# Patient Record
Sex: MALE | Race: WHITE
[De-identification: names, ages, dates, MRNs, and addresses within clinical notes are randomized per-mention and may not be internally consistent; named-entity substitution may affect disease eponyms.]

---

## 2019-09-20 ENCOUNTER — HOSPITAL ENCOUNTER (EMERGENCY)
Dept: HOSPITAL 25 - UCCORT | Age: 29
Discharge: HOME | End: 2019-09-20
Payer: COMMERCIAL

## 2019-09-20 VITALS — DIASTOLIC BLOOD PRESSURE: 96 MMHG | SYSTOLIC BLOOD PRESSURE: 146 MMHG

## 2019-09-20 DIAGNOSIS — F17.210: ICD-10-CM

## 2019-09-20 DIAGNOSIS — E11.9: ICD-10-CM

## 2019-09-20 DIAGNOSIS — S06.0X9A: ICD-10-CM
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
## 2019-09-20 DIAGNOSIS — Z79.4: ICD-10-CM

## 2019-09-20 DIAGNOSIS — Z79.84: ICD-10-CM

## 2019-09-20 DIAGNOSIS — E78.5: ICD-10-CM
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
## 2019-09-20 DIAGNOSIS — I10: ICD-10-CM

## 2019-09-20 DIAGNOSIS — S09.90XA: Primary | ICD-10-CM
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
## 2019-09-20 PROCEDURE — G0463 HOSPITAL OUTPT CLINIC VISIT: HCPCS

## 2019-09-20 PROCEDURE — 99201: CPT

## 2019-09-20 NOTE — UC
Head Injury HPI





- HPI Summary


HPI Summary: 


29-year-old male comes in with chief complaint of head injury.  Occurred today 

at work several hours ago.  Patient stood up and hit a metal pole.  He had pain 

right away the left forehead where he struck his head.  No laceration.  He was 

nauseous for about an hour.  He does have some photophobia.  No vomiting.  

Patient is not on a blood thinner.  Weakness or numbness no blurred vision no 

difficulty with speech.  Headaches about a 3 out of 10.  Take acetaminophen for 

pain which did help some with the headache.  Activity and looking at computer 

screens does make the headache worse.  Also eye movement looking up on the left 

makes the pain worse.  No complaint of double vision no pain in the cheeks.





- History Of Current Complaint


Chief Complaint: UCHeadInjury


Stated Complaint: HEAD INJURY - WC


Time Seen by Provider: 09/20/19 17:51


Pain Intensity: 4





- Allergies/Home Medications


Allergies/Adverse Reactions: 


 Allergies











Allergy/AdvReac Type Severity Reaction Status Date / Time


 


No Known Allergies Allergy   Verified 09/20/19 17:22











Home Medications: 


 Home Medications





Acetaminophen TAB* [Tylenol TAB*] 650 mg PO Q4H PRN 09/20/19 [History Confirmed 

09/20/19]


Insulin Glargine,Hum.rec.anlog [Basaglar Kwikpen] 15 unit SC BEDTIME 09/20/19 [

History Confirmed 09/20/19]


Insulin Lispro [Admelog Solostar] 8 unit SQ SEE INSTRUCTIONS 09/20/19 [History 

Confirmed 09/20/19]


Lisinopril 20 mg PO BEDTIME 09/20/19 [History Confirmed 09/20/19]


Sertraline* [Zoloft*] 25 mg PO DAILY 09/20/19 [History Confirmed 09/20/19]


Simvastatin TAB(NF) [Zocor(NF)] 10 mg PO BEDTIME 09/20/19 [History Confirmed 09/ 20/19]


metFORMIN* [Glucophage 1000 MG TAB *] 1,000 mg PO BID 09/20/19 [History 

Confirmed 09/20/19]











PMH/Surg Hx/FS Hx/Imm Hx


Previously Healthy: Yes


Endocrine History: Diabetes, Dyslipidemia


Cardiovascular History: Hypertension





- Surgical History


Surgical History: None





- Family History


Known Family History: Positive: Non-Contributory





- Social History


Alcohol Use: Occasionally


Substance Use Type: None


Smoking Status (MU): Current Some Day Smoker


Type: Cigarettes


Amount Used/How Often: once every couple months with alcohol


Have You Smoked in the Last Year: Yes





Review of Systems


All Other Systems Reviewed And Are Negative: Yes


Constitutional: Positive: Other - SEE HPI


Skin: Positive: Other - SEE HPI


Eyes: Positive: Other - SEE HPI


ENT: Positive: Negative


Respiratory: Positive: Negative


Cardiovascular: Positive: Negative


Gastrointestinal: Positive: Nausea


Motor: Positive: Negative


Neurovascular: Positive: Negative


Musculoskeletal: Positive: Negative


Neurological: Positive: Headache


Psychological: Positive: Negative


Is Patient Immunocompromised?: No





Physical Exam


Triage Information Reviewed: Yes


Appearance: Well-Appearing, No Pain Distress, Well-Nourished


Vital Signs: 


 Initial Vital Signs











Temp  97.5 F   09/20/19 17:27


 


Pulse  118   09/20/19 17:27


 


Resp  17   09/20/19 17:27


 


BP  146/96   09/20/19 17:27


 


Pulse Ox  100   09/20/19 17:27











Vital Signs Reviewed: Yes


Eyes: Positive: Conjunctiva Clear, Other: - PERRLA EOMI there is mild 

photophobia.


ENT: Positive: TMs normal - No hemotympanum, Other - Zygomatic arches are 

nontender to palpation the area of it's tender to palpation his left forehead.


Neck: Positive: Supple


Respiratory: Positive: Lungs clear, Normal breath sounds, No respiratory 

distress


Cardiovascular: Positive: RRR


Musculoskeletal: Positive: Strength Intact, ROM Intact


Neurological: Positive: Alert, Muscle Tone Normal


Psychological: Positive: Age Appropriate Behavior


Skin: Positive: Other - There is minimal ecchymosis left forehead there is no 

skin break.  The area is mildly tender to palpation there is no crepitus.





Head Injury Course/Dx





- Course


Course Of Treatment: 


Patient did not lose consciousness he had no vomiting no weakness or numbness.  

Is not on a blood thinner.  No confusion or focal neurologic deficit.  We 

discussed getting a head CT at this time we will not based on his injury and 

his symptoms.  He's can continue acetaminophen or ibuprofen for pain.  Have him 

out of work for the next 2 days and then return to work without restrictions as 

long as he is completely improved.  It is not completely improved with the 

following up with occupational medicine or sports medicine.  I let him know if 

he got worse with weakness numbness unexplained vomiting confusion or any other 

concerns he needed to go to the emergency department for further evaluation and 

care.





- Differential Dx/Diagnosis


Provider Diagnosis: 


 Head injury, Concussion








Discharge ED





- Sign-Out/Discharge


Documenting (check all that apply): Patient Departure


All imaging exams completed and their final reports reviewed: No Studies





- Discharge Plan


Condition: Stable


Disposition: HOME


Patient Education Materials:  Concussion (ED), Head Injury (ED)


Forms:  *Work Release


Referrals: 


Delicia Mares DO [Primary Care Provider] - 


Philip Ferris MD [Medical Doctor] - 


[facility] [Provider Group]


Additional Instructions: 


FOLLOW UP WITH OCCUPATIONAL MEDICINE, DR FERRIS, OR SPORTS MEDICINE IF NOT 

COMPLETELY IMPROVED.


GO TO THE EMERGENCY DEPARTMENT IF YOUR CONDITION WORSENS; PAIN, WEAKNESS, 

NUMBNESS, DIFFICULTY WITH VISION OR SPEECH, UNEXPLAINED VOMITING OR ANY 

QUESTIONS OR CONCERNS.








- Billing Disposition and Condition


Condition: STABLE


Disposition: Home

## 2019-11-18 ENCOUNTER — HOSPITAL ENCOUNTER (EMERGENCY)
Dept: HOSPITAL 25 - UCCORT | Age: 29
Discharge: HOME | End: 2019-11-18
Payer: COMMERCIAL

## 2019-11-18 VITALS — SYSTOLIC BLOOD PRESSURE: 156 MMHG | DIASTOLIC BLOOD PRESSURE: 97 MMHG

## 2019-11-18 DIAGNOSIS — I10: ICD-10-CM

## 2019-11-18 DIAGNOSIS — L03.113: Primary | ICD-10-CM

## 2019-11-18 DIAGNOSIS — F17.210: ICD-10-CM

## 2019-11-18 DIAGNOSIS — E11.9: ICD-10-CM

## 2019-11-18 PROCEDURE — 99212 OFFICE O/P EST SF 10 MIN: CPT

## 2019-11-18 PROCEDURE — G0463 HOSPITAL OUTPT CLINIC VISIT: HCPCS

## 2019-11-18 NOTE — UC
Skin Complaint HPI





- HPI Summary


HPI Summary: 


29-year-old male with history of diabetes presents with complaints of a red, 

tender lesion to his inner left upper arm he noted this morning.  States 

throughout the day he is noted some redness spreading from the lesion.  Reports 

previous episodes of cellulitis and states this is very similar to those 

episodes.  Denies fever, chills, or drainage.








- History of Current Complaint


Chief Complaint: UCSkin


Time Seen by Provider: 11/18/19 18:39


Stated Complaint: LT ARM SKIN COMPLAINT


Hx Obtained From: Patient


Pain Intensity: 0





- Allergy/Home Medications


Allergies/Adverse Reactions: 


 Allergies











Allergy/AdvReac Type Severity Reaction Status Date / Time


 


No Known Allergies Allergy   Verified 11/18/19 18:10











Home Medications: 


 Home Medications





hydroCHLOROthiazide [Hydrochlorothiazide] 12.5 mg PO DAILY 11/18/19 [History 

Confirmed 11/18/19]











PMH/Surg Hx/FS Hx/Imm Hx


Endocrine History: Diabetes, Dyslipidemia


Cardiovascular History: Hypertension





- Surgical History


Surgical History: None





- Family History


Known Family History: Positive: Non-Contributory





- Social History


Occupation: Employed Full-time


Lives: With Family


Alcohol Use: Rare


Substance Use Type: None


Smoking Status (MU): Current Some Day Smoker


Type: Cigarettes


Amount Used/How Often: "A couple cigerettes a day."


Length of Time of Smoking/Using Tobacco: one yr.


Have You Smoked in the Last Year: Yes





Review of Systems


All Other Systems Reviewed And Are Negative: Yes


Constitutional: Negative: Fever, Chills


Skin: Positive: Other - See HPI


Respiratory: Positive: Negative


Cardiovascular: Positive: Negative


Gastrointestinal: Positive: Negative


Genitourinary: Positive: Negative


Musculoskeletal: Positive: Negative


Neurological: Positive: Negative


Is Patient Immunocompromised?: No





Physical Exam





- Summary


Physical Exam Summary: 


GENERAL APPEARANCE: Well developed, well nourished, alert and cooperative, and 

appears to be in no acute distress.





CARDIAC: Normal S1 and S2. No S3, S4 or murmurs. Rhythm is regular. There is no 

peripheral edema, cyanosis or pallor. Extremities are warm and well perfused. 

Capillary refill is less than 2 seconds. Peripheral pulses intact.





LUNGS: Clear to auscultation without rales, rhonchi, wheezing or diminished 

breath sounds.





ABDOMEN: Positive bowel sounds. Soft, nondistended, nontender. No guarding or 

rebound. No masses or hepatosplenomegally.





MUSKULOSKELETAL: ROM intact to all extremities. No joint erythema or 

tenderness. Normal muscular development. Normal gait.





EXTREMITIES: 1 cm circular area of induration to the mid, inner upper arm with 

an area of surrounding erythema approximately 3-4 cm in diameter. No 

fluctuation or drainage noted.





SKIN: Skin normal color, texture and turgor with no lesions or eruptions.





Triage Information Reviewed: Yes


Vital Signs: 


 Initial Vital Signs











Temp  98.5 F   11/18/19 18:14


 


Pulse  91   11/18/19 18:14


 


Resp  20   11/18/19 18:14


 


BP  156/97   11/18/19 18:14


 


Pulse Ox  98   11/18/19 18:14











Vital Signs Reviewed: Yes





Course/Dx





- Course


Course Of Treatment: 


29-year-old male with history of diabetes presents with complaints of a red, 

tender lesion to his inner left upper arm he noted this morning.  States 

throughout the day he is noted some redness spreading from the lesion.  Reports 

previous episodes of cellulitis and states this is very similar to those 

episodes.  Denies fever, chills, or drainage.  Afebrile.  Hypertensive 

otherwise signs stable.  Patient had a 1 cm circular area of induration to the 

mid, inner upper arm with an area of surrounding erythema approximately 3-4 cm 

in diameter. No fluctuation or drainage noted.  Will treat for a cellulitis 

versus early abscess with cephalexin 500 mg 3 times a day 7 days.  I have 

recommended that the patient to some hot packing of the area.  He is to follow-

up with his primary care provider in 3 days if symptoms are not improving.  

Anticipatory guidance and warning symptoms were reviewed with the patient.  

Verbalizes understanding and agrees with plan of care.











- Differential Diagnoses - Skin Complaint


Differential Diagnoses: Abscess, Cellulitis, Contact Dermatitis, Local Allergic 

Reaction





- Diagnoses


Provider Diagnosis: 


 Cellulitis of left upper arm








Discharge ED





- Sign-Out/Discharge


Documenting (check all that apply): Patient Departure


All imaging exams completed and their final reports reviewed: No Studies





- Discharge Plan


Condition: Stable


Disposition: HOME


Prescriptions: 


Cephalexin CAP* [Keflex 500 CAP*] 500 mg PO TID #21 cap


Patient Education Materials:  Cellulitis (ED)


Referrals: 


Eran Celaya [Primary Care Provider] - 3 Days (If no improvement in symptoms.)


Additional Instructions: 


The area of redness to your arm appears to be an infection of the skin 

cellulitis although I cannot fully rule out the possibility of an early 

abscess.  We will start on an antibiotic to treat the infection.





Take cephalexin 500 mg 3 times a day for 7 days.  Be sure to complete the 

entire course even if feeling better.





Apply a hot moist compress to the affected area for 15 minutes at least 4 times 

a day.





Take acetaminophen (Tylenol) or ibuprofen (Advil, Motrin) according to 

directions as needed for pain.





Follow-up with your primary care provider in 3 days if symptoms are not 

improving.





Seek immediate medical attention in the emergency room if you develop a fever 

greater than 100.5 F, the redness rapidly spreads, if swelling of the arm, 

severe pain that is not managed with pain medication, or any worsening of 

symptoms.





- Billing Disposition and Condition


Condition: STABLE


Disposition: Home





- Attestation Statements


Provider Attestation: 





This patient was not seen by me.


I was available for consult.


Chart reviewed.





ARI